# Patient Record
(demographics unavailable — no encounter records)

---

## 2025-05-12 NOTE — ADDENDUM
[FreeTextEntry1] : Blood will be drawn in office today.  This note was written by Demetra Sanders on 05/12/2025 acting as medical scribe for Dr. Mariano Cabrera. I, Dr. Mariano Cabrera, have read and attest that all the information, medical decision making and discharge instructions within are true and accurate.

## 2025-05-12 NOTE — HISTORY OF PRESENT ILLNESS
[FreeTextEntry1] : Mr. LEOPOLDO BRANNON is a 21-year-old male who presents for initial endocrine evaluation. Patient presents with regard to a history of low TSH.  No additional medical history.  The patient reports palpitations when running or dehydrated. Too reports of shakiness but is unsure if it is due to anxiety. He states that he has gained at least 8 lbs over the past few months. Denies tremors, severe fatigue, temp intolerance, significant weight changes or hair loss. Too, denies any changes in skin or nails.  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  During the recent b/w, the patient had a possible viral infection. Labs 02/26/25 TSH: low at 0.34 Total T4: 10.2 FT4: 1.6 FT3: 4.2 Total T3: 129    ____________________________________________________________________________________________________ Surgeries: None  Allergies: None  Additional Medications:  Supplements: None  Social Hx: Denies tobacco or drug use. Drinks alcohol occasionally and used to vape in the past.  FHx: None